# Patient Record
(demographics unavailable — no encounter records)

---

## 2024-11-04 NOTE — REASON FOR VISIT
[Follow-up] : a follow-up of an existing diagnosis [FreeTextEntry1] : CAD with stents, Rosie filter, Weight loss

## 2024-11-04 NOTE — PHYSICAL EXAM
[Alert] : alert [Normal Voice/Communication] : normal voice/communication [Healthy Appearing] : healthy appearing [No Acute Distress] : no acute distress [Sclera] : the sclera and conjunctiva were normal [Hearing Threshold Finger Rub Not [place]] : hearing was normal [Normal Lips/Gums] : the lips and gums were normal [Oropharynx] : the oropharynx was normal [Normal Appearance] : the appearance of the neck was normal [No Neck Mass] : no neck mass was observed [No Respiratory Distress] : no respiratory distress [No Acc Muscle Use] : no accessory muscle use [Respiration, Rhythm And Depth] : normal respiratory rhythm and effort [Auscultation Breath Sounds / Voice Sounds] : lungs were clear to auscultation bilaterally [Heart Rate And Rhythm] : heart rate was normal and rhythm regular [Normal S1, S2] : normal S1 and S2 [Murmurs] : no murmurs [Bowel Sounds] : normal bowel sounds [Abdomen Tenderness] : non-tender [No Masses] : no abdominal mass palpated [Abdomen Soft] : soft [] : no hepatosplenomegaly [Oriented To Time, Place, And Person] : oriented to person, place, and time

## 2024-11-04 NOTE — HISTORY OF PRESENT ILLNESS
[FreeTextEntry1] : Patient is a 90-year-old gentleman who complains of excessive passing of flatus.  Occasionally it is foul-smelling.  The symptoms are chronic but have gotten worse recently.  He denies seeing any blood or mucus in the stool.  He does not have any abdominal pain.  He denies any weight loss. Recent CT of the abdomen and pelvis which was done for flank pain and hematuria and patient was diagnosed with UTI.  This was done on 7/20/2023.  There was mild distention of the rectum with stool and gas.  There was stool and gas throughout the colon.  The colon was somewhat dilated without any wall thickening or inflammation or obstruction.  The right colon diameter measured 5.6 cm.  The mid transverse colon measured 3.5 cm in the region of the splenic flexure measured 4.1 cm.  There was no small bowel dilatation.  There was redundancy of the sigmoid colon with some sigmoid diverticulosis without diverticulitis. Patient moves his bowels daily.  He does take stool softeners twice a day.  Patient has a PM/AICD.  11/6/2023-patient is doing much better on Amitiza 8 mcg twice daily along with taking Lactaid tablets.  He also takes Gas-X on a as needed basis with improvement of his bloating, his constipation, and is foul-smelling stool.  He has a daily bowel movement.  He continues to take stool softeners twice daily. He recently saw pulmonary for a cough which usually occurs in the morning.  He was told this may be related to reflux and was put on pantoprazole 40 mg at night.  The cough has improved.  5/6/2024-patient was hospitalized at [facility] in November.  He did have an MI and coronary stent.  He also presumably had a Rosie filter. His bowel movements continue to have improved on Amitiza 8 mcg twice daily.  His gassiness is better on Gas-X and Lactaid tablets.  He is currently on Brilinta and aspirin. Patient has no upper gastrointestinal symptoms. While hospitalized he had lost about 15 to 20 pounds.  He claims to have gained back about 7 to 8 pounds.  11/4/2024-patient continues to do well.  He has much less gassiness on Gas-X and Beano which she takes on a as needed basis.  His bowel movements are good.  He has no GERD symptoms.  He denies any nausea or vomiting.  He has no abdominal pain.  His appetite is better.  His weight is stable.  He is not able to gain weight.

## 2024-11-04 NOTE — ASSESSMENT
[FreeTextEntry1] : Patient with no GI complaints.  He will continue his current regimen.  His weight has been stable.

## 2025-03-05 NOTE — ASSESSMENT
[FreeTextEntry1] : Objective:   - Diagnostic Results: The cystoscopy and biopsy performed by another urologist in office resulted in findings of atypia in urinary tract, but not dysplasia or cancer.   - Vital Signs: No vital signs were mentioned in the transcription.   - Physical Examination (PE): Unable to perform physical examination due to consultation being performed via telemedicine.   Assessment and Plan:   - Potential ureteral growth: A growth found in the right ureter during a cystoscopy and biopsy. Pathology indicated papillary atypia, but not dysplasia or cancer.     - Therapeutic Interventions: None planned for now, but will monitor patient's urinary health and recurring hematuria.      - Diagnostic Tests: Follow-up testing involves a urine test and a repeat cystoscopy in April to track progression of ureteral irregularity.      - Referrals: Referral to Dr. Serjio Irby at [facility] for follow-up cystoscopy.      - Patient Education: Advised on potential causes of hematuria, including straining during bowel movements due to constipation; advised on increasing stool softener intake to address constipation.      - Follow-Up: A check-in follow-up appointment to discuss urine test results via telemedicine has been scheduled.   Preparation, telehealth visit and coordination of care took 50 minutes.

## 2025-03-05 NOTE — HISTORY OF PRESENT ILLNESS
[FreeTextEntry1] :  Subjective:   - Summary: Mr. Bazzi is a 90-year-old former  presenting with recurrent hematuria and urinary discomfort. He recently underwent a biopsy after a CAT scan indicated a possible issue. The biopsy results involved atypia, but did not show signs of cancer or dysplasia.   - Chief Complaint (CC): Recurrent hematuria and urinary discomfort   - History of Present Illness: Mr. Bazzi first noted back pain in November, which resulted in a primary care referral for a CAT scan. The scan indicated a possible irregularity causing his urologist to perform a cystoscopy and biopsy. The biopsy showed atypia but did not indicate cancer or dysplasia. Mr. Bazzi reports periodic episodes of hematuria, most recently after consuming alcohol, and occasional constipation.     - Occupation: Retired      - Alcohol Consumption: Occasional, primarily at social functions     - Smoking: None     - Exercise: Walks regularly with wife   - Review of Systems: Not detailed within the conversation.   - General: The patient is in good general health for his age with good complexion and clear speech.   - Neurological: No notable symptoms reported.   - Musculoskeletal: The patient reported back pain in November.   - Cardiovascular: No notable symptoms reported.   - Respiratory: No notable symptoms reported.   - Gastrointestinal: The patient reported occasional constipation.   - Genitourinary: The patient reported recurrent hematuria.   - Integumentary: No notable symptoms reported.   - Psychiatric: No notable symptoms reported.   - Medications:     - Dutasteride     - Tadalafil     - Tamsulosin     - Aspirin (baby dose)     - Unspecified blood pressure medication   - Allergies: No allergies were mentioned in the transcript.   Objective:   - Diagnostic Results: The cystoscopy and biopsy performed by another urologist in office resulted in findings of atypia in urinary tract, but not dysplasia or cancer.   - Vital Signs: No vital signs were mentioned in the transcription.   - Physical Examination (PE): Unable to perform physical examination due to consultation being performed via telemedicine.   Assessment and Plan:   - Potential ureteral growth: A growth found in the right ureter during a cystoscopy and biopsy. Pathology indicated papillary atypia, but not dysplasia or cancer.     - Therapeutic Interventions: None planned for now, but will monitor patient's urinary health and recurring hematuria.      - Diagnostic Tests: Follow-up testing involves a urine test and a repeat cystoscopy in April to track progression of ureteral irregularity.      - Referrals: Referral to Dr. Serjio Irby at [facility] for follow-up cystoscopy.      - Patient Education: Advised on potential causes of hematuria, including straining during bowel movements due to constipation; advised on increasing stool softener intake to address constipation.      - Follow-Up: A check-in follow-up appointment to discuss urine test results via telemedicine has been scheduled.

## 2025-03-05 NOTE — HISTORY OF PRESENT ILLNESS
[FreeTextEntry1] :  Subjective:   - Summary: Mr. Bazzi is a 90-year-old former  presenting with recurrent hematuria and urinary discomfort. He recently underwent a biopsy after a CAT scan indicated a possible issue. The biopsy results involved atypia, but did not show signs of cancer or dysplasia.   - Chief Complaint (CC): Recurrent hematuria and urinary discomfort   - History of Present Illness: Mr. Bazzi first noted back pain in November, which resulted in a primary care referral for a CAT scan. The scan indicated a possible irregularity causing his urologist to perform a cystoscopy and biopsy. The biopsy showed atypia but did not indicate cancer or dysplasia. Mr. Bazzi reports periodic episodes of hematuria, most recently after consuming alcohol, and occasional constipation.     - Occupation: Retired      - Alcohol Consumption: Occasional, primarily at social functions     - Smoking: None     - Exercise: Walks regularly with wife   - Review of Systems: Not detailed within the conversation.   - General: The patient is in good general health for his age with good complexion and clear speech.   - Neurological: No notable symptoms reported.   - Musculoskeletal: The patient reported back pain in November.   - Cardiovascular: No notable symptoms reported.   - Respiratory: No notable symptoms reported.   - Gastrointestinal: The patient reported occasional constipation.   - Genitourinary: The patient reported recurrent hematuria.   - Integumentary: No notable symptoms reported.   - Psychiatric: No notable symptoms reported.   - Medications:     - Dutasteride     - Tadalafil     - Tamsulosin     - Aspirin (baby dose)     - Unspecified blood pressure medication   - Allergies: No allergies were mentioned in the transcript.   Objective:   - Diagnostic Results: The cystoscopy and biopsy performed by another urologist in office resulted in findings of atypia in urinary tract, but not dysplasia or cancer.   - Vital Signs: No vital signs were mentioned in the transcription.   - Physical Examination (PE): Unable to perform physical examination due to consultation being performed via telemedicine.   Assessment and Plan:   - Potential ureteral growth: A growth found in the right ureter during a cystoscopy and biopsy. Pathology indicated papillary atypia, but not dysplasia or cancer.     - Therapeutic Interventions: None planned for now, but will monitor patient's urinary health and recurring hematuria.      - Diagnostic Tests: Follow-up testing involves a urine test and a repeat cystoscopy in April to track progression of ureteral irregularity.      - Referrals: Referral to Dr. Serjio Irby at [facility] for follow-up cystoscopy.      - Patient Education: Advised on potential causes of hematuria, including straining during bowel movements due to constipation; advised on increasing stool softener intake to address constipation.      - Follow-Up: A check-in follow-up appointment to discuss urine test results via telemedicine has been scheduled.

## 2025-03-05 NOTE — PHYSICAL EXAM
[General Appearance - Well Developed] : well developed [General Appearance - Well Nourished] : well nourished [Normal Appearance] : normal appearance [Well Groomed] : well groomed [General Appearance - In No Acute Distress] : no acute distress [] : no respiratory distress [Exaggerated Use Of Accessory Muscles For Inspiration] : no accessory muscle use

## 2025-03-05 NOTE — REVIEW OF SYSTEMS
[Fever] : no fever [Chills] : no chills [Feeling Poorly] : not feeling poorly [see HPI] : see HPI [Confused] : no confusion [Convulsions] : no convulsions

## 2025-03-05 NOTE — ASSESSMENT
[FreeTextEntry1] : Objective:   - Diagnostic Results: The cystoscopy and biopsy performed by another urologist in office resulted in findings of atypia in urinary tract, but not dysplasia or cancer.   - Vital Signs: No vital signs were mentioned in the transcription.   - Physical Examination (PE): Unable to perform physical examination due to consultation being performed via telemedicine.   Assessment and Plan:   - Potential ureteral growth: A growth found in the right ureter during a cystoscopy and biopsy. Pathology indicated papillary atypia, but not dysplasia or cancer.     - Therapeutic Interventions: None planned for now, but will monitor patient's urinary health and recurring hematuria.      - Diagnostic Tests: Follow-up testing involves a urine test and a repeat cystoscopy in April to track progression of ureteral irregularity.      - Referrals: Referral to Dr. Serjio Irby at [facility] for follow-up cystoscopy.      - Patient Education: Advised on potential causes of hematuria, including straining during bowel movements due to constipation; advised on increasing stool softener intake to address constipation.      - Follow-Up: A check-in follow-up appointment to discuss urine test results via telemedicine has been scheduled.   Preparation, telehealth visit and coordination of care took 50 minutes.

## 2025-03-05 NOTE — PHYSICAL EXAM
[General Appearance - Well Developed] : well developed [General Appearance - Well Nourished] : well nourished [Normal Appearance] : normal appearance [Well Groomed] : well groomed [General Appearance - In No Acute Distress] : no acute distress [] : no respiratory distress [Exaggerated Use Of Accessory Muscles For Inspiration] : no accessory muscle use [Oriented To Time, Place, And Person] : oriented to person, place, and time [Mood] : the mood was normal [Not Anxious] : not anxious [de-identified] : Patient seen from the chest up there were no apparent neurologic deficits.

## 2025-03-05 NOTE — ASSESSMENT
[FreeTextEntry1] : Objective:   - Diagnostic Results: The cystoscopy and biopsy performed by another urologist in office resulted in findings of atypia in urinary tract, but not dysplasia or cancer.     Assessment and Plan:   - Potential ureteral growth: A growth found in the right ureter during a cystoscopy and biopsy. Pathology indicated papillary atypia, but not dysplasia or cancer.     - Therapeutic Interventions: None planned for now, but will monitor patient's urinary health and recurring hematuria.      - Diagnostic Tests: Follow-up testing involves a urine test and a repeat cystoscopy in April to track progression of ureteral irregularity.      - Referrals: Referral to Dr. Serjio Irby at [facility] for follow-up cystoscopy.      - Patient Education: Advised on potential causes of hematuria, including straining during bowel movements due to constipation; advised on increasing stool softener intake to address constipation.      - Follow-Up: A check-in follow-up appointment to discuss urine test results via telemedicine has been scheduled.   Preparation, telehealth visit and coordination of care took 50 minutes.

## 2025-03-05 NOTE — HISTORY OF PRESENT ILLNESS
[FreeTextEntry1] :  Patient gave permission for audiovisual telehealth visit.  The patient was at his home in [place], [place].  I was in my office in [place], [place].  Subjective:   - Summary: Mr. Bazzi is a 90-year-old former  presenting with recurrent hematuria and urinary discomfort. He recently underwent a biopsy after a CAT scan indicated a possible issue. The biopsy results involved atypia, but did not show signs of cancer or dysplasia.   - Chief Complaint (CC): Recurrent hematuria and urinary discomfort   - History of Present Illness: Mr. Bazzi first noted back pain in November, which resulted in a primary care referral for a CAT scan. The scan indicated a possible irregularity causing his urologist to perform a cystoscopy and biopsy. The biopsy showed atypia but did not indicate cancer or dysplasia. Mr. Bazzi reports periodic episodes of hematuria, most recently after consuming alcohol, and occasional constipation.     - Occupation: Retired      - Alcohol Consumption: Occasional, primarily at social functions     - Smoking: None     - Exercise: Walks regularly with wife   - Review of Systems: Not detailed within the conversation.   - General: The patient is in good general health for his age with good complexion and clear speech.   - Neurological: No notable symptoms reported.   - Musculoskeletal: The patient reported back pain in November.   - Cardiovascular: No notable symptoms reported.   - Respiratory: No notable symptoms reported.   - Gastrointestinal: The patient reported occasional constipation.   - Genitourinary: The patient reported recurrent hematuria.   - Integumentary: No notable symptoms reported.   - Psychiatric: No notable symptoms reported.   - Medications:     - Dutasteride     - Tadalafil     - Tamsulosin     - Aspirin (baby dose)     - Unspecified blood pressure medication   - Allergies: No allergies were mentioned in the transcript.   Objective:   - Diagnostic Results: The cystoscopy and biopsy performed by another urologist in office resulted in findings of atypia in urinary tract, but not dysplasia or cancer.   - Vital Signs: No vital signs were mentioned in the transcription.   - Physical Examination (PE): Unable to perform physical examination due to consultation being performed via telemedicine.   Assessment and Plan:   - Potential ureteral growth: A growth found in the right ureter during a cystoscopy and biopsy. Pathology indicated papillary atypia, but not dysplasia or cancer.     - Therapeutic Interventions: None planned for now, but will monitor patient's urinary health and recurring hematuria.      - Diagnostic Tests: Follow-up testing involves a urine test and a repeat cystoscopy in April to track progression of ureteral irregularity.      - Referrals: Referral to Dr. Serjio Irby at [facility] for follow-up cystoscopy.      - Patient Education: Advised on potential causes of hematuria, including straining during bowel movements due to constipation; advised on increasing stool softener intake to address constipation.      - Follow-Up: A check-in follow-up appointment to discuss urine test results via telemedicine has been scheduled.

## 2025-04-03 NOTE — HISTORY OF PRESENT ILLNESS
[FreeTextEntry1] : Referred by Dr. Serg Foley.  Has history of recurrent gross hematuria. Had CT a/p urogram December 2024 at [facility]> Findings showed filling defect in the right upper pole calyx as well as soft tissue mass in the right distal ureter. Had cystoscopy and biopsy of the right ureter by Dr. Ryan Stoner on 1/21/2025. Pathology:  atypia.  Urine cytology March 2025: atypical cells.  Over the past month, having recurrent hematuria but no retention. Denies flank pain, dysuria.

## 2025-04-03 NOTE — ASSESSMENT
[FreeTextEntry1] : Office cystoscopy performed today which demonstrated a 2 cm papillary/nodular bladder tumor emanating and obstructing the right ureter. Findings consistent with urothelial carcinoma.    Given recurrent hematuria and cystoscopy findings, recommend outpatient surgery for cystoscopy, transurethral resection of bladder tumor/right ureteral orifice with ureteroscopy and laser ablation of upper pole calyx tumor.  Operative procedure, risks and benefits reviewed with the patient. He agrees to proceed as recommended. Will need clearance prior to surgery.  All questions answered.

## 2025-04-30 NOTE — REVIEW OF SYSTEMS
[As Noted in HPI] : as noted in HPI [Urinary Difficulties] : urinary difficulties [Negative] : Heme/Lymph [FreeTextEntry5] : PM/AICD

## 2025-04-30 NOTE — HISTORY OF PRESENT ILLNESS
[FreeTextEntry1] : Patient is a 90-year-old gentleman who complains of excessive passing of flatus.  Occasionally it is foul-smelling.  The symptoms are chronic but have gotten worse recently.  He denies seeing any blood or mucus in the stool.  He does not have any abdominal pain.  He denies any weight loss. Recent CT of the abdomen and pelvis which was done for flank pain and hematuria and patient was diagnosed with UTI.  This was done on 7/20/2023.  There was mild distention of the rectum with stool and gas.  There was stool and gas throughout the colon.  The colon was somewhat dilated without any wall thickening or inflammation or obstruction.  The right colon diameter measured 5.6 cm.  The mid transverse colon measured 3.5 cm in the region of the splenic flexure measured 4.1 cm.  There was no small bowel dilatation.  There was redundancy of the sigmoid colon with some sigmoid diverticulosis without diverticulitis. Patient moves his bowels daily.  He does take stool softeners twice a day.  Patient has a PM/AICD.  11/6/2023-patient is doing much better on Amitiza 8 mcg twice daily along with taking Lactaid tablets.  He also takes Gas-X on a as needed basis with improvement of his bloating, his constipation, and is foul-smelling stool.  He has a daily bowel movement.  He continues to take stool softeners twice daily. He recently saw pulmonary for a cough which usually occurs in the morning.  He was told this may be related to reflux and was put on pantoprazole 40 mg at night.  The cough has improved.  5/6/2024-patient was hospitalized at [facility] in November.  He did have an MI and coronary stent.  He also presumably had a Rosie filter. His bowel movements continue to have improved on Amitiza 8 mcg twice daily.  His gassiness is better on Gas-X and Lactaid tablets.  He is currently on Brilinta and aspirin. Patient has no upper gastrointestinal symptoms. While hospitalized he had lost about 15 to 20 pounds.  He claims to have gained back about 7 to 8 pounds.  11/4/2024-patient continues to do well.  He has much less gassiness on Gas-X and Beano which she takes on a as needed basis.  His bowel movements are good.  He has no GERD symptoms.  He denies any nausea or vomiting.  He has no abdominal pain.  His appetite is better.  His weight is stable.  He is not able to gain weight.  4/30/2025-Patient continues to do well as far as his GI symptoms are concerned.  He still has gassiness and is passing flatus.  He does take Gas-X, Beano, and Lactaid tablets on a as needed basis.  His bowel movements are much better since he started lubiprostone 8 mcg twice daily.  He is continue to take this.  Occasionally he does need some prunes.  He has no abdominal pain.  His weight is stable.

## 2025-04-30 NOTE — ASSESSMENT
[FreeTextEntry1] : Patient continues to take pantoprazole 20 mg daily.  He is also on Gas-X, Beano, and Lactaid tablets on a as needed basis. He will continue to take lubiprostone twice a day.  He will be seen in follow-up in 6 months.

## 2025-04-30 NOTE — PHYSICAL EXAM
[Alert] : alert [Normal Voice/Communication] : normal voice/communication [Healthy Appearing] : healthy appearing [No Acute Distress] : no acute distress [Sclera] : the sclera and conjunctiva were normal [Hearing Threshold Finger Rub Not [place]] : hearing was normal [Normal Lips/Gums] : the lips and gums were normal [Oropharynx] : the oropharynx was normal [Normal Appearance] : the appearance of the neck was normal [No Neck Mass] : no neck mass was observed [No Respiratory Distress] : no respiratory distress [No Acc Muscle Use] : no accessory muscle use [Respiration, Rhythm And Depth] : normal respiratory rhythm and effort [Heart Rate And Rhythm] : heart rate was normal and rhythm regular [Auscultation Breath Sounds / Voice Sounds] : lungs were clear to auscultation bilaterally [Normal S1, S2] : normal S1 and S2 [Murmurs] : no murmurs [Bowel Sounds] : normal bowel sounds [Abdomen Tenderness] : non-tender [No Masses] : no abdominal mass palpated [Abdomen Soft] : soft [] : no hepatosplenomegaly [Oriented To Time, Place, And Person] : oriented to person, place, and time

## 2025-07-30 NOTE — HISTORY OF PRESENT ILLNESS
[FreeTextEntry1] : Patient provide the informed consent for audiovisual telehealth visit.  He was in [place], [place].  I was in my office in [place], [place].  The patient's primary urologic care is now with Dr. Serjio Vines. The patient had had a recent episode of gross hematuria and was being evaluated by Dr. Vines who has been explaining the findings to the patient.  The patient expressed to me that he had a basic understanding of what has been found but wished to review with me the some of the findings to make sure that he had a correct understanding.  Patient has had resection of left trigone urothelial carcinoma by Dr. Serjio Vines.  Prior to that he has had a partial left nephrectomy.  The patient's creatinine had been 1.12 on May 7, 2023.  April 30, 2025 serum creatinine was 2.78 and has since declined to 1.97 on July 9, 2025.  He had had May 28, 2025 ultrasound of the abdomen for evaluation of kidneys and bladder.  It had been ordered by Dr. Vines.  It was to examine the site of the past partial left nephrectomy  as well as to evaluate for causes for recent elevation of creatinine.  Multiple right renal cysts were identified largest on the right was 2.9 cm.  The right kidney did not have any calculi, neoplasms or hydronephrosis.  The left kidney had no renal tumors, hydronephrosis or calculi.  There were multiple cysts measuring up to 5 cm on the left.  Bladder capacity have been normal at 395 cm and urination left a postvoid residual of 80 cm.  This ultrasound was prior to the more recent gross hematuria episode.  To evaluate the lesions gross hematuria Dr. Vines ordered a CT scan without contrast.  The CT scan findings were notable for an aorta dilated to 4.5 cm.  There was a stable 3 mm nodule in the lingula of the lung.  It was unchanged since May 30, 2023 there is high attenuation material in the right renal collecting system at the upper pole.  There was no hydronephrosis.  The cyst in the upper pole of the right kidney was unchanged since 5/30/2023 several cysts are seen in the left kidney.  The bladder appeared normal the prostate was enlarged.  The inferior vena cava had a filter.  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  There was no lymphadenopathy.  The interpretation of the CT scan recommended ultrasound to evaluate for underlying lesion such as neoplasm that might be in the upper pole of the right kidney which might be the source of bleeding.  I explained these findings to the patient.  As there was a prior ultrasound in May the patient asked why another 1 was needed at this time and I said that things may have changed since May as there have been no gross hematuria back pain no gross hematuria was subsequent.  I will leave it to Dr. Vines to consider whether MRI pre and post IV gadolinium contrast is a prudent next step in evaluation of the gross hematuria and the high attenuation material in the upper pole of the right kidney.  Proceeding to ureteroscopy directly is also possible.  The gross hematuria has resolved.  I would consider reimaging with MRI to see if clot has lysed and there is any abnormality in the whole collecting system at this time.  The patient is 91 years old and in  good condition for a 91 year-old man.  I discussed the findings of the May 28, 2025 ultrasound of the kidneys and bladder as well as the July 16, 2025 CT scan with the patient.  He will follow-up with Dr. Vines on August 13, 2025.

## 2025-07-30 NOTE — PHYSICAL EXAM
[General Appearance - Well Developed] : well developed [General Appearance - Well Nourished] : well nourished [Normal Appearance] : normal appearance [Well Groomed] : well groomed [General Appearance - In No Acute Distress] : no acute distress [] : no respiratory distress [Skin Color & Pigmentation] : normal skin color and pigmentation [Oriented To Time, Place, And Person] : oriented to person, place, and time [Affect] : the affect was normal [Mood] : the mood was normal [Not Anxious] : not anxious [de-identified] : Patient was seen from the chin up.  There were no involuntary movements seen.  There was no apparent focal motor neuron deficit.  There is no dysarthria.  There is no word finding difficulty.  Memory appeared good comprehension and cognition were good

## 2025-07-30 NOTE — ASSESSMENT
[FreeTextEntry1] : The patient's primary urologic care is now with Dr. Serjio Vines. The patient had had a recent episode of gross hematuria and was being evaluated by Dr. Vines who has been explaining the findings to the patient.  The patient expressed to me that he had a basic understanding of what has been found but wished to review with me the some of the findings to make sure that he had a correct understanding.  Patient has had resection of left trigone urothelial carcinoma by Dr. Serjio Vines.  Prior to that he has had a partial left nephrectomy.  The patient's creatinine had been 1.12 on May 7, 2023.  April 30, 2025 serum creatinine was 2.78 and has since declined to 1.97 on July 9, 2025.  He had had May 28, 2025 ultrasound of the abdomen for evaluation of kidneys and bladder.  It had been ordered by Dr. Vines.  It was to examine the site of the past partial left nephrectomy  as well as to evaluate for causes for recent elevation of creatinine.  Multiple right renal cysts were identified largest on the right was 2.9 cm.  The right kidney did not have any calculi, neoplasms or hydronephrosis.  The left kidney had no renal tumors, hydronephrosis or calculi.  There were multiple cysts measuring up to 5 cm on the left.  Bladder capacity have been normal at 395 cm and urination left a postvoid residual of 80 cm.  This ultrasound was prior to the more recent gross hematuria episode.  To evaluate the lesions gross hematuria Dr. Vines ordered a CT scan without contrast.  The CT scan findings were notable for an aorta dilated to 4.5 cm.  There was a stable 3 mm nodule in the lingula of the lung.  It was unchanged since May 30, 2023 there is high attenuation material in the right renal collecting system at the upper pole.  There was no hydronephrosis.  The cyst in the upper pole of the right kidney was unchanged since 5/30/2023 several cysts are seen in the left kidney.  The bladder appeared normal the prostate was enlarged.  The inferior vena cava had a filter.  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  There was no lymphadenopathy.  The interpretation of the CT scan recommended ultrasound to evaluate for underlying lesion such as neoplasm that might be in the upper pole of the right kidney which might be the source of bleeding.  I explained these findings to the patient.  As there was a prior ultrasound in May the patient asked why another 1 was needed at this time and I said that things may have changed since May as there have been no gross hematuria back pain no gross hematuria was subsequent.  I will leave it to Dr. Vines to consider whether MRI pre and post IV gadolinium contrast is a prudent next step in evaluation of the gross hematuria and the high attenuation material in the upper pole of the right kidney.  Proceeding to ureteroscopy directly is also possible.  The gross hematuria has resolved.  I would consider reimaging with MRI to see if clot has lysed and there is any abnormality in the whole collecting system at this time.  The patient is 91 years old and in  good condition for a 91 year-old man.  I discussed the findings of the May 28, 2025 ultrasound of the kidneys and bladder as well as the July 16, 2025 CT scan with the patient.  He will follow-up with Dr. Vines on August 13, 2025.  Preparation, A/V telehealth visit and coordination of care took 30 minutes.

## 2025-07-30 NOTE — HISTORY OF PRESENT ILLNESS
[FreeTextEntry1] : Patient provide the informed consent for audiovisual telehealth visit.  He was in [place], [place].  I was in my office in [place], [place].  The patient's primary urologic care is now with Dr. Serjio Vines. The patient had had a recent episode of gross hematuria and was being evaluated by Dr. Vines who has been explaining the findings to the patient.  The patient expressed to me that he had a basic understanding of what has been found but wished to review with me the some of the findings to make sure that he had a correct understanding.  Patient has had resection of left trigone urothelial carcinoma by Dr. Serjio Vines.  Prior to that he has had a partial left nephrectomy.  The patient's creatinine had been 1.12 on May 7, 2023.  April 30, 2025 serum creatinine was 2.78 and has since declined to 1.97 on July 9, 2025.  He had had May 28, 2025 ultrasound of the abdomen for evaluation of kidneys and bladder.  It had been ordered by Dr. Vines.  It was to examine the site of the past partial left nephrectomy  as well as to evaluate for causes for recent elevation of creatinine.  Multiple right renal cysts were identified largest on the right was 2.9 cm.  The right kidney did not have any calculi, neoplasms or hydronephrosis.  The left kidney had no renal tumors, hydronephrosis or calculi.  There were multiple cysts measuring up to 5 cm on the left.  Bladder capacity have been normal at 395 cm and urination left a postvoid residual of 80 cm.  This ultrasound was prior to the more recent gross hematuria episode.  To evaluate the lesions gross hematuria Dr. Vines ordered a CT scan without contrast.  The CT scan findings were notable for an aorta dilated to 4.5 cm.  There was a stable 3 mm nodule in the lingula of the lung.  It was unchanged since May 30, 2023 there is high attenuation material in the right renal collecting system at the upper pole.  There was no hydronephrosis.  The cyst in the upper pole of the right kidney was unchanged since 5/30/2023 several cysts are seen in the left kidney.  The bladder appeared normal the prostate was enlarged.  The inferior vena cava had a filter.  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  There was no lymphadenopathy.  The interpretation of the CT scan recommended ultrasound to evaluate for underlying lesion such as neoplasm that might be in the upper pole of the right kidney which might be the source of bleeding.  I explained these findings to the patient.  As there was a prior ultrasound in May the patient asked why another 1 was needed at this time and I said that things may have changed since May as there have been no gross hematuria back pain no gross hematuria was subsequent.  I will leave it to Dr. Vines to consider whether MRI pre and post IV gadolinium contrast is a prudent next step in evaluation of the gross hematuria and the high attenuation material in the upper pole of the right kidney.  Proceeding to ureteroscopy directly is also possible.  The gross hematuria has resolved.  I would consider reimaging with MRI to see if clot has lysed and there is any abnormality in the whole collecting system at this time.  The patient is 91 years old and in  good condition for a 91 year-old man.  I discussed the findings of the May 28, 2025 ultrasound of the kidneys and bladder as well as the July 16, 2025 CT scan with the patient.  He will follow-up with Dr. Vines on August 13, 2025.

## 2025-07-30 NOTE — PHYSICAL EXAM
[General Appearance - Well Developed] : well developed [General Appearance - Well Nourished] : well nourished [Normal Appearance] : normal appearance [Well Groomed] : well groomed [General Appearance - In No Acute Distress] : no acute distress [] : no respiratory distress [Skin Color & Pigmentation] : normal skin color and pigmentation [Oriented To Time, Place, And Person] : oriented to person, place, and time [Affect] : the affect was normal [Mood] : the mood was normal [Not Anxious] : not anxious [de-identified] : Patient was seen from the chin up.  There were no involuntary movements seen.  There was no apparent focal motor neuron deficit.  There is no dysarthria.  There is no word finding difficulty.  Memory appeared good comprehension and cognition were good

## 2025-07-30 NOTE — REVIEW OF SYSTEMS
[see HPI] : see HPI [Easy Bleeding] : a tendency for easy bleeding [Fever] : no fever [Chills] : no chills [Feeling Poorly] : not feeling poorly [Confused] : no confusion [Suicidal] : not suicidal